# Patient Record
Sex: MALE | Race: BLACK OR AFRICAN AMERICAN | NOT HISPANIC OR LATINO
[De-identification: names, ages, dates, MRNs, and addresses within clinical notes are randomized per-mention and may not be internally consistent; named-entity substitution may affect disease eponyms.]

---

## 2018-10-03 ENCOUNTER — TRANSCRIPTION ENCOUNTER (OUTPATIENT)
Age: 33
End: 2018-10-03

## 2018-11-16 ENCOUNTER — TRANSCRIPTION ENCOUNTER (OUTPATIENT)
Age: 33
End: 2018-11-16

## 2020-04-13 ENCOUNTER — EMERGENCY (EMERGENCY)
Facility: HOSPITAL | Age: 35
LOS: 1 days | Discharge: HOME | End: 2020-04-13
Attending: EMERGENCY MEDICINE | Admitting: EMERGENCY MEDICINE
Payer: MEDICAID

## 2020-04-13 VITALS
SYSTOLIC BLOOD PRESSURE: 137 MMHG | DIASTOLIC BLOOD PRESSURE: 87 MMHG | TEMPERATURE: 100 F | RESPIRATION RATE: 20 BRPM | OXYGEN SATURATION: 100 % | HEART RATE: 102 BPM

## 2020-04-13 PROCEDURE — 93010 ELECTROCARDIOGRAM REPORT: CPT

## 2020-04-13 PROCEDURE — 99283 EMERGENCY DEPT VISIT LOW MDM: CPT | Mod: 25

## 2020-04-13 RX ORDER — FAMOTIDINE 10 MG/ML
1 INJECTION INTRAVENOUS
Qty: 30 | Refills: 0
Start: 2020-04-13 | End: 2020-05-12

## 2020-04-13 RX ORDER — ONDANSETRON 8 MG/1
1 TABLET, FILM COATED ORAL
Qty: 12 | Refills: 0
Start: 2020-04-13 | End: 2020-04-15

## 2020-04-13 NOTE — ED PROVIDER NOTE - NSFOLLOWUPINSTRUCTIONS_ED_ALL_ED_FT
You most likely have a covid-19 infection. Stay well-hydrated, and quarantine yourself for 14 days. Take tylenol and ibuprofen as needed for body aches and fevers. Take the prescribed zofran as needed for nausea/vomiting, and take the pepcid as prescribed for stomach acid. Follow-up with your doctor in 2-3 days. Return to the emergency department if you have worsening shortness of breath, cannot keep down liquids, have severe chest or abdominal pain, fevers that do not come down with ibuprofen or tylenol, or any other concerns.

## 2020-04-13 NOTE — ED PROVIDER NOTE - OBJECTIVE STATEMENT
Pt c/o 8 days of symptoms, started with nasal congestion, fevers (tmax 102 F), diarrhea, lack of appetite, fatigue. Started having some diffuse chest pain, R > L, yesterday, worse with coughing, not pleuritic, mild. Having some shortness of breath after walking a few blocks, but none at rest. Non-productive cough. Has some nausea, feels like there is increased acid in his stomach, no vomiting. His sister has tested positive for COVID-19. 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 Pt has hx HIV, on HAART, recent CD4 count 300s.

## 2020-04-13 NOTE — ED PROVIDER NOTE - PATIENT PORTAL LINK FT
You can access the FollowMyHealth Patient Portal offered by [facility] by registering at the following website: [email or web address]. By joining [facility]’s FollowMyHealth portal, you will also be able to view your health information using other applications (apps) compatible with our system.

## 2020-04-13 NOTE — ED PROVIDER NOTE - CLINICAL SUMMARY MEDICAL DECISION MAKING FREE TEXT BOX
33 yo M with hx HIV, here with URI symptoms followed by chest pain and shortness of breath. Sister with COVID-19 infection. Likely pt also has COVID, however is very well-appearing with normal VS. Ambulatory sat 97%. Less likely lobar pneumonia, ACS, PE, PTX. Will d/c home with Rx pepcid and zofran for GERD symptoms/nausea, pmd f/u, return precautions.

## 2020-04-14 ENCOUNTER — INPATIENT (INPATIENT)
Facility: HOSPITAL | Age: 35
LOS: 2 days | Discharge: HOME | DRG: 177 | End: 2020-04-17
Attending: INTERNAL MEDICINE | Admitting: INTERNAL MEDICINE
Payer: MEDICAID

## 2020-04-14 ENCOUNTER — APPOINTMENT (OUTPATIENT)
Dept: DISASTER EMERGENCY | Facility: CLINIC | Age: 35
End: 2020-04-14
Payer: MEDICAID

## 2020-04-14 ENCOUNTER — APPOINTMENT (OUTPATIENT)
Dept: DISASTER EMERGENCY | Facility: CLINIC | Age: 35
End: 2020-04-14

## 2020-04-14 VITALS
HEIGHT: 75 IN | BODY MASS INDEX: 37.3 KG/M2 | OXYGEN SATURATION: 89 % | WEIGHT: 300 LBS | TEMPERATURE: 101.8 F | RESPIRATION RATE: 14 BRPM | SYSTOLIC BLOOD PRESSURE: 121 MMHG | DIASTOLIC BLOOD PRESSURE: 71 MMHG

## 2020-04-14 VITALS
HEIGHT: 75 IN | SYSTOLIC BLOOD PRESSURE: 155 MMHG | OXYGEN SATURATION: 94 % | HEART RATE: 100 BPM | DIASTOLIC BLOOD PRESSURE: 82 MMHG | WEIGHT: 279.99 LBS | TEMPERATURE: 102 F | RESPIRATION RATE: 20 BRPM

## 2020-04-14 DIAGNOSIS — R50.9 FEVER, UNSPECIFIED: ICD-10-CM

## 2020-04-14 DIAGNOSIS — R06.02 SHORTNESS OF BREATH: ICD-10-CM

## 2020-04-14 DIAGNOSIS — J12.9 VIRAL PNEUMONIA, UNSPECIFIED: ICD-10-CM

## 2020-04-14 DIAGNOSIS — R68.89 OTHER GENERAL SYMPTOMS AND SIGNS: ICD-10-CM

## 2020-04-14 DIAGNOSIS — U07.1 COVID-19: ICD-10-CM

## 2020-04-14 DIAGNOSIS — J18.9 PNEUMONIA, UNSPECIFIED ORGANISM: ICD-10-CM

## 2020-04-14 DIAGNOSIS — Z21 ASYMPTOMATIC HUMAN IMMUNODEFICIENCY VIRUS [HIV] INFECTION STATUS: ICD-10-CM

## 2020-04-14 DIAGNOSIS — R06.2 WHEEZING: ICD-10-CM

## 2020-04-14 DIAGNOSIS — R06.00 DYSPNEA, UNSPECIFIED: ICD-10-CM

## 2020-04-14 DIAGNOSIS — R05 COUGH: ICD-10-CM

## 2020-04-14 PROBLEM — Z00.00 ENCOUNTER FOR PREVENTIVE HEALTH EXAMINATION: Status: ACTIVE | Noted: 2020-04-14

## 2020-04-14 LAB
ALBUMIN SERPL ELPH-MCNC: 3.4 G/DL — LOW (ref 3.5–5)
ALP SERPL-CCNC: 44 U/L — SIGNIFICANT CHANGE UP (ref 40–120)
ALT FLD-CCNC: 30 U/L DA — SIGNIFICANT CHANGE UP (ref 10–60)
ANION GAP SERPL CALC-SCNC: 5 MMOL/L — SIGNIFICANT CHANGE UP (ref 5–17)
AST SERPL-CCNC: 28 U/L — SIGNIFICANT CHANGE UP (ref 10–40)
BASOPHILS # BLD AUTO: 0.01 K/UL — SIGNIFICANT CHANGE UP (ref 0–0.2)
BASOPHILS NFR BLD AUTO: 0.2 % — SIGNIFICANT CHANGE UP (ref 0–2)
BILIRUB SERPL-MCNC: 0.6 MG/DL — SIGNIFICANT CHANGE UP (ref 0.2–1.2)
BUN SERPL-MCNC: 9 MG/DL — SIGNIFICANT CHANGE UP (ref 7–18)
CALCIUM SERPL-MCNC: 8.5 MG/DL — SIGNIFICANT CHANGE UP (ref 8.4–10.5)
CHLORIDE SERPL-SCNC: 97 MMOL/L — SIGNIFICANT CHANGE UP (ref 96–108)
CK SERPL-CCNC: 115 U/L — SIGNIFICANT CHANGE UP (ref 35–232)
CO2 SERPL-SCNC: 29 MMOL/L — SIGNIFICANT CHANGE UP (ref 22–31)
CREAT SERPL-MCNC: 1.16 MG/DL — SIGNIFICANT CHANGE UP (ref 0.5–1.3)
D DIMER BLD IA.RAPID-MCNC: 183 NG/ML DDU — SIGNIFICANT CHANGE UP
EOSINOPHIL # BLD AUTO: 0.17 K/UL — SIGNIFICANT CHANGE UP (ref 0–0.5)
EOSINOPHIL NFR BLD AUTO: 2.8 % — SIGNIFICANT CHANGE UP (ref 0–6)
GLUCOSE SERPL-MCNC: 84 MG/DL — SIGNIFICANT CHANGE UP (ref 70–99)
HCT VFR BLD CALC: 45.4 % — SIGNIFICANT CHANGE UP (ref 39–50)
HGB BLD-MCNC: 15.3 G/DL — SIGNIFICANT CHANGE UP (ref 13–17)
IMM GRANULOCYTES NFR BLD AUTO: 0.5 % — SIGNIFICANT CHANGE UP (ref 0–1.5)
LDH SERPL L TO P-CCNC: 278 U/L — HIGH (ref 120–225)
LYMPHOCYTES # BLD AUTO: 1.01 K/UL — SIGNIFICANT CHANGE UP (ref 1–3.3)
LYMPHOCYTES # BLD AUTO: 16.8 % — SIGNIFICANT CHANGE UP (ref 13–44)
MCHC RBC-ENTMCNC: 28.1 PG — SIGNIFICANT CHANGE UP (ref 27–34)
MCHC RBC-ENTMCNC: 33.7 GM/DL — SIGNIFICANT CHANGE UP (ref 32–36)
MCV RBC AUTO: 83.5 FL — SIGNIFICANT CHANGE UP (ref 80–100)
MONOCYTES # BLD AUTO: 0.57 K/UL — SIGNIFICANT CHANGE UP (ref 0–0.9)
MONOCYTES NFR BLD AUTO: 9.5 % — SIGNIFICANT CHANGE UP (ref 2–14)
NEUTROPHILS # BLD AUTO: 4.22 K/UL — SIGNIFICANT CHANGE UP (ref 1.8–7.4)
NEUTROPHILS NFR BLD AUTO: 70.2 % — SIGNIFICANT CHANGE UP (ref 43–77)
NRBC # BLD: 0 /100 WBCS — SIGNIFICANT CHANGE UP (ref 0–0)
PLATELET # BLD AUTO: 219 K/UL — SIGNIFICANT CHANGE UP (ref 150–400)
POTASSIUM SERPL-MCNC: 3.7 MMOL/L — SIGNIFICANT CHANGE UP (ref 3.5–5.3)
POTASSIUM SERPL-SCNC: 3.7 MMOL/L — SIGNIFICANT CHANGE UP (ref 3.5–5.3)
PROT SERPL-MCNC: 9.8 G/DL — HIGH (ref 6–8.3)
RBC # BLD: 5.44 M/UL — SIGNIFICANT CHANGE UP (ref 4.2–5.8)
RBC # FLD: 12.2 % — SIGNIFICANT CHANGE UP (ref 10.3–14.5)
SODIUM SERPL-SCNC: 131 MMOL/L — LOW (ref 135–145)
TROPONIN I SERPL-MCNC: <0.015 NG/ML — SIGNIFICANT CHANGE UP (ref 0–0.04)
WBC # BLD: 6.01 K/UL — SIGNIFICANT CHANGE UP (ref 3.8–10.5)
WBC # FLD AUTO: 6.01 K/UL — SIGNIFICANT CHANGE UP (ref 3.8–10.5)

## 2020-04-14 PROCEDURE — 99213 OFFICE O/P EST LOW 20 MIN: CPT

## 2020-04-14 PROCEDURE — 71045 X-RAY EXAM CHEST 1 VIEW: CPT | Mod: 26

## 2020-04-14 PROCEDURE — 99222 1ST HOSP IP/OBS MODERATE 55: CPT

## 2020-04-14 PROCEDURE — 99285 EMERGENCY DEPT VISIT HI MDM: CPT

## 2020-04-14 RX ORDER — ACETAMINOPHEN 500 MG
650 TABLET ORAL EVERY 6 HOURS
Refills: 0 | Status: DISCONTINUED | OUTPATIENT
Start: 2020-04-14 | End: 2020-04-17

## 2020-04-14 RX ORDER — HYDROXYCHLOROQUINE SULFATE 200 MG
TABLET ORAL
Refills: 0 | Status: DISCONTINUED | OUTPATIENT
Start: 2020-04-14 | End: 2020-04-17

## 2020-04-14 RX ORDER — HYDROXYCHLOROQUINE SULFATE 200 MG
400 TABLET ORAL EVERY 24 HOURS
Refills: 0 | Status: DISCONTINUED | OUTPATIENT
Start: 2020-04-16 | End: 2020-04-17

## 2020-04-14 RX ORDER — HYDROXYCHLOROQUINE SULFATE 200 MG
800 TABLET ORAL EVERY 24 HOURS
Refills: 0 | Status: COMPLETED | OUTPATIENT
Start: 2020-04-15 | End: 2020-04-15

## 2020-04-14 RX ORDER — ELVITEGRAVIR, COBICISTAT, EMTRICITABINE, AND TENOFOVIR ALAFENAMIDE 150; 150; 200; 10 MG/1; MG/1; MG/1; MG/1
1 TABLET ORAL DAILY
Refills: 0 | Status: DISCONTINUED | OUTPATIENT
Start: 2020-04-14 | End: 2020-04-17

## 2020-04-14 RX ORDER — CHOLECALCIFEROL (VITAMIN D3) 125 MCG
1000 CAPSULE ORAL DAILY
Refills: 0 | Status: DISCONTINUED | OUTPATIENT
Start: 2020-04-14 | End: 2020-04-17

## 2020-04-14 RX ORDER — ENOXAPARIN SODIUM 100 MG/ML
40 INJECTION SUBCUTANEOUS EVERY 12 HOURS
Refills: 0 | Status: DISCONTINUED | OUTPATIENT
Start: 2020-04-14 | End: 2020-04-17

## 2020-04-14 RX ORDER — ACETAMINOPHEN 500 MG
975 TABLET ORAL ONCE
Refills: 0 | Status: COMPLETED | OUTPATIENT
Start: 2020-04-14 | End: 2020-04-14

## 2020-04-14 RX ORDER — KETOROLAC TROMETHAMINE 30 MG/ML
15 SYRINGE (ML) INJECTION ONCE
Refills: 0 | Status: DISCONTINUED | OUTPATIENT
Start: 2020-04-14 | End: 2020-04-14

## 2020-04-14 RX ORDER — ELVITEGRAVIR, COBICISTAT, EMTRICITABINE, AND TENOFOVIR ALAFENAMIDE 150; 150; 200; 10 MG/1; MG/1; MG/1; MG/1
1 TABLET ORAL
Qty: 0 | Refills: 0 | DISCHARGE

## 2020-04-14 RX ORDER — SODIUM CHLORIDE 9 MG/ML
500 INJECTION INTRAMUSCULAR; INTRAVENOUS; SUBCUTANEOUS ONCE
Refills: 0 | Status: COMPLETED | OUTPATIENT
Start: 2020-04-14 | End: 2020-04-14

## 2020-04-14 RX ORDER — ASCORBIC ACID 60 MG
500 TABLET,CHEWABLE ORAL DAILY
Refills: 0 | Status: DISCONTINUED | OUTPATIENT
Start: 2020-04-14 | End: 2020-04-17

## 2020-04-14 RX ORDER — ERGOCALCIFEROL 1.25 MG/1
50000 CAPSULE ORAL ONCE
Refills: 0 | Status: COMPLETED | OUTPATIENT
Start: 2020-04-14 | End: 2020-04-14

## 2020-04-14 RX ADMIN — Medication 15 MILLIGRAM(S): at 18:21

## 2020-04-14 RX ADMIN — Medication 975 MILLIGRAM(S): at 21:03

## 2020-04-14 NOTE — ED PROVIDER NOTE - CLINICAL SUMMARY MEDICAL DECISION MAKING FREE TEXT BOX
34 yr old male with hx of HIV, undetectable viral load and CD4 above 300 presents to ed c/o fever, myalgia, cough, sob, fatigue, headache, nausea, diarrhea x 10 days worsening past 4 days. no smoking, no rashes, no abd pain.  compliant on hiv meds since 2008    pt with covid like sx r/o pcp vs pna in an HIV pt.  labs, cxr, toradol, re-assess

## 2020-04-14 NOTE — H&P ADULT - NSHPREVIEWOFSYSTEMS_GEN_ALL_CORE
REVIEW OF SYSTEMS:    CONSTITUTIONAL:+ Fevers and chills;  No weakness,   EYES/ENT: No visual changes;  No vertigo or throat pain   NECK: No pain or stiffness  RESPIRATORY: +cough, wheezing, hemoptysis; + shortness of breath  CARDIOVASCULAR: No chest pain or palpitations  GASTROINTESTINAL: No abdominal or epigastric pain. No nausea, vomiting, or hematemesis; No diarrhea or constipation. No melena or hematochezia.  GENITOURINARY: No dysuria, frequency or hematuria  NEUROLOGICAL: No numbness or weakness  SKIN: No itching, burning, rashes, or lesions   All other review of systems is negative unless indicated above.

## 2020-04-14 NOTE — ED PROVIDER NOTE - OBJECTIVE STATEMENT
34 yr old male with hx of HIV, undetectable viral load and CD4 above 300 presents to ed c/o fever, myalgia, cough, sob, fatigue, headache, nausea, diarrhea x 10 days worsening past 4 days. no smoking, no rashes, no abd pain.  compliant on hiv meds since 2008

## 2020-04-14 NOTE — ED ADULT NURSE NOTE - OBJECTIVE STATEMENT
33 yo male sent to ED from urgent care. Patient complaining of body aches x 10 days as well as fever, nausea, diarrhea x 4 days. Patient states he took 1000mg Tylenol at 1000 today. Tmax 101.8 at home.

## 2020-04-14 NOTE — H&P ADULT - NSHPPHYSICALEXAM_GEN_ALL_CORE
CONSTITUTIONAL: Well appearing, well nourished, awake, alert and in no apparent distress. SAO2 in RA is 96% drops to 91% with moderate exertion ( and rises back to 94%  ENMT: Airway patent, Nasal mucosa clear. Mouth with normal mucosa. Throat has no vesicles, no oropharyngeal exudates and uvula is midline.  EYES: Clear bilaterally, pupils equal, round and reactive to light. EOMI.  CARDIAC: Normal rate, regular rhythm.  Heart sounds S1, S2.  No murmurs, rubs or gallops   RESPIRATORY: Breath sounds clear. No apparent wheezes, rales or rhonchi  MUSCULOSKELETAL: Spine appears normal, range of motion is not limited, no muscle or joint tenderness  EXTREMITIES: No edema, cyanosis or deformity   NEUROLOGICAL: Alert and oriented, no focal deficits, no motor or sensory deficits.  SKIN: No rash, skin turgor

## 2020-04-14 NOTE — H&P ADULT - HISTORY OF PRESENT ILLNESS
34 year old man with PMH of HIV on ART- suboptimal compliance on Genvoya ( VL 20100/ CD 4 -314)  who presents with 10 days of fevers, cough and SOB. 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 34 year old man with PMH of HIV on ART- suboptimal compliance on Genvoya ( VL 20100/ CD 4 -314)  who presents with 10 days of fevers, cough and SOB. 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  He comes in today on account of worsening SOB  In the ED, he was said to be hypoxic with mild exertion.

## 2020-04-14 NOTE — H&P ADULT - PROBLEM SELECTOR PLAN 1
Admit to Medicine with COVID 19 isolation protocols  Stable on room air  F/up COVID 19 pcr; baseline crp /d-dimer /ferritin /ldh /procalcitonin  Serial follow up of acute phase reactants  Empiric antibiotic with Zithromax and ceftriaxone  Plaquenil regimen  Daily Lovenox 40 mg subcut  Empiric steroids  Intermittent SaO2 monitor.  Supportive care- antitussive/antipyretics.

## 2020-04-14 NOTE — ED PROVIDER NOTE - PROGRESS NOTE DETAILS
Urena: cxr mild increase perihilar marking. pt sat well while at rest but sat drops to 85% RA with minimal exertion.  admit to med for dyspnea likely covid.  pt also at the stage where symptoms can worsen

## 2020-04-14 NOTE — ED ADULT NURSE NOTE - NSIMPLEMENTINTERV_GEN_ALL_ED
Implemented All Universal Safety Interventions:  Wann to call system. Call bell, personal items and telephone within reach. Instruct patient to call for assistance. Room bathroom lighting operational. Non-slip footwear when patient is off stretcher. Physically safe environment: no spills, clutter or unnecessary equipment. Stretcher in lowest position, wheels locked, appropriate side rails in place.

## 2020-04-14 NOTE — H&P ADULT - NSHPLABSRESULTS_GEN_ALL_CORE
15.3   6.01  )-----------( 219      ( 14 Apr 2020 18:15 )             45.4     04-14    131<L>  |  97  |  9   ----------------------------<  84  3.7   |  29  |  1.16    Ca    8.5      14 Apr 2020 18:15    TPro  9.8<H>  /  Alb  3.4<L>  /  TBili  0.6  /  DBili  x   /  AST  28  /  ALT  30  /  AlkPhos  44  04-14    CARDIAC MARKERS ( 14 Apr 2020 18:15 )  <0.015 ng/mL / x     / 115 U/L / x     / x        CXR - B/L LL pneumonia    EKG - NSR with normal QTc level

## 2020-04-14 NOTE — H&P ADULT - ASSESSMENT
34 year old man with hx of HIV on ART - suboptimal control but with sufficient T cells (CD4 count) to reduce the chances /possibilities of opportunistic infections like PJP. Pt's clinical presentation and lab/imaging findings c/w COVID 19 pneumonia. There is no acute respiratory failure present on my evaluation

## 2020-04-15 LAB
CRP SERPL-MCNC: 8.45 MG/DL — HIGH (ref 0–0.4)
FERRITIN SERPL-MCNC: 503 NG/ML — HIGH (ref 30–400)
PROCALCITONIN SERPL-MCNC: 0.1 NG/ML — SIGNIFICANT CHANGE UP (ref 0.02–0.1)
SARS-COV-2 N GENE NPH QL NAA+PROBE: DETECTED

## 2020-04-15 PROCEDURE — 99232 SBSQ HOSP IP/OBS MODERATE 35: CPT

## 2020-04-15 RX ORDER — ONDANSETRON 8 MG/1
4 TABLET, FILM COATED ORAL THREE TIMES A DAY
Refills: 0 | Status: DISCONTINUED | OUTPATIENT
Start: 2020-04-15 | End: 2020-04-17

## 2020-04-15 RX ADMIN — Medication 400 MILLIGRAM(S): at 21:26

## 2020-04-15 RX ADMIN — Medication 650 MILLIGRAM(S): at 17:19

## 2020-04-15 RX ADMIN — ERGOCALCIFEROL 50000 UNIT(S): 1.25 CAPSULE ORAL at 01:11

## 2020-04-15 RX ADMIN — Medication 800 MILLIGRAM(S): at 01:11

## 2020-04-15 RX ADMIN — ENOXAPARIN SODIUM 40 MILLIGRAM(S): 100 INJECTION SUBCUTANEOUS at 17:20

## 2020-04-15 RX ADMIN — Medication 650 MILLIGRAM(S): at 23:30

## 2020-04-15 RX ADMIN — Medication 650 MILLIGRAM(S): at 01:12

## 2020-04-15 RX ADMIN — ELVITEGRAVIR, COBICISTAT, EMTRICITABINE, AND TENOFOVIR ALAFENAMIDE 1 TABLET(S): 150; 150; 200; 10 TABLET ORAL at 12:56

## 2020-04-15 RX ADMIN — ENOXAPARIN SODIUM 40 MILLIGRAM(S): 100 INJECTION SUBCUTANEOUS at 05:46

## 2020-04-15 RX ADMIN — Medication 500 MILLIGRAM(S): at 12:56

## 2020-04-15 RX ADMIN — ONDANSETRON 4 MILLIGRAM(S): 8 TABLET, FILM COATED ORAL at 22:34

## 2020-04-15 RX ADMIN — Medication 650 MILLIGRAM(S): at 06:58

## 2020-04-15 RX ADMIN — Medication 1000 UNIT(S): at 12:56

## 2020-04-15 RX ADMIN — SODIUM CHLORIDE 2000 MILLILITER(S): 9 INJECTION INTRAMUSCULAR; INTRAVENOUS; SUBCUTANEOUS at 01:11

## 2020-04-15 NOTE — PROGRESS NOTE ADULT - PROBLEM SELECTOR PLAN 1
1. Admit to Medicine with COVID 19 isolation protocols  2. Stable on room air  3. F/u COVID 19 pcr - pending   4. c/w Plaquenil regimen  5. c/w Daily Lovenox 40 mg   6. c/w Empiric steroids  7. Supportive care- antitussive/antipyretics.

## 2020-04-15 NOTE — PROGRESS NOTE ADULT - SUBJECTIVE AND OBJECTIVE BOX
NP Note discussed with  Primary Attending    Patient is a 34y old  Male who presents with a chief complaint of Fever/ cough/SOB X 10 days (14 Apr 2020 23:14)      INTERVAL HPI/OVERNIGHT EVENTS: no new complaints    MEDICATIONS  (STANDING):  ascorbic acid 500 milliGRAM(s) Oral daily  cholecalciferol 1000 Unit(s) Oral daily  enoxaparin Injectable 40 milliGRAM(s) SubCutaneous every 12 hours  hydroxychloroquine   Oral   tenofovir alafenamide 10 mG/[id number] mG/cobicistat 150 mG/emtricitabine 200 mG (GENVOYA) 1 Tablet(s) Oral daily    MEDICATIONS  (PRN):  acetaminophen   Tablet .. 650 milliGRAM(s) Oral every 6 hours PRN Temp greater or equal to 38C (100.4F)      __________________________________________________  REVIEW OF SYSTEMS:    CONSTITUTIONAL: No fever,   EYES: no acute visual disturbances  NECK: No pain or stiffness  RESPIRATORY: No cough; No shortness of breath  CARDIOVASCULAR: No chest pain, no palpitations  GASTROINTESTINAL: No pain. No nausea or vomiting; No diarrhea   NEUROLOGICAL: No headache or numbness, no tremors  MUSCULOSKELETAL: No joint pain, no muscle pain  GENITOURINARY: no dysuria, no frequency, no hesitancy  PSYCHIATRY: no depression , no anxiety  ALL OTHER  ROS negative        Vital Signs Last 24 Hrs  T(C): 37.3 (15 Apr 2020 08:00), Max: 39 (14 Apr 2020 17:32)  T(F): 99.2 (15 Apr 2020 08:00), Max: 102.2 (14 Apr 2020 17:32)  HR: 92 (15 Apr 2020 08:00) (89 - 100)  BP: 118/76 (15 Apr 2020 08:00) (118/76 - 162/94)  BP(mean): --  RR: 18 (15 Apr 2020 08:00) (17 - 20)  SpO2: 95% (15 Apr 2020 08:00) (94% - 97%)    ________________________________________________  PHYSICAL EXAM:  GENERAL: NAD  HEENT: Normocephalic;  conjunctivae and sclerae clear; moist mucous membranes;   NECK : supple  CHEST/LUNG: Clear to auscultation bilaterally with good air entry   HEART: S1 S2  regular; no murmurs, gallops or rubs  ABDOMEN: Soft, Nontender, Nondistended; Bowel sounds present  EXTREMITIES: no cyanosis; no edema; no calf tenderness  SKIN: warm and dry; no rash  NERVOUS SYSTEM:  Awake and alert; Oriented  to place, person and time ; no new deficits    _________________________________________________  LABS:                        15.3   6.01  )-----------( 219      ( 14 Apr 2020 18:15 )             45.4     04-14    131<L>  |  97  |  9   ----------------------------<  84  3.7   |  29  |  1.16    Ca    8.5      14 Apr 2020 18:15    TPro  9.8<H>  /  Alb  3.4<L>  /  TBili  0.6  /  DBili  x   /  AST  28  /  ALT  30  /  AlkPhos  44  04-14        CAPILLARY BLOOD GLUCOSE

## 2020-04-15 NOTE — ED ADULT NURSE REASSESSMENT NOTE - NS ED NURSE REASSESS COMMENT FT1
Ambulated pt in the halls with O2 monitoring. Pt desat from 97% to 92% on RA. Pt denies any symptoms of fatigue while ambulating this time. Continue to monitor.
Spoke to supervisor Stacey regarding patients COVID results. She approved that patient may go up to up .
Pt given Tylenol for fever. Pt educated on using Peak Flow meter (360-Best effort) and using Incentive Spirometer.

## 2020-04-16 ENCOUNTER — TRANSCRIPTION ENCOUNTER (OUTPATIENT)
Age: 35
End: 2020-04-16

## 2020-04-16 DIAGNOSIS — Z02.9 ENCOUNTER FOR ADMINISTRATIVE EXAMINATIONS, UNSPECIFIED: ICD-10-CM

## 2020-04-16 DIAGNOSIS — Z29.9 ENCOUNTER FOR PROPHYLACTIC MEASURES, UNSPECIFIED: ICD-10-CM

## 2020-04-16 DIAGNOSIS — U07.1 COVID-19: ICD-10-CM

## 2020-04-16 PROCEDURE — 99232 SBSQ HOSP IP/OBS MODERATE 35: CPT

## 2020-04-16 RX ORDER — ASCORBIC ACID 60 MG
1 TABLET,CHEWABLE ORAL
Qty: 14 | Refills: 0
Start: 2020-04-16 | End: 2020-04-29

## 2020-04-16 RX ORDER — CHOLECALCIFEROL (VITAMIN D3) 125 MCG
1000 CAPSULE ORAL
Qty: 30 | Refills: 0
Start: 2020-04-16 | End: 2020-05-15

## 2020-04-16 RX ORDER — ACETAMINOPHEN 500 MG
2 TABLET ORAL
Qty: 40 | Refills: 0
Start: 2020-04-16 | End: 2020-04-20

## 2020-04-16 RX ADMIN — ENOXAPARIN SODIUM 40 MILLIGRAM(S): 100 INJECTION SUBCUTANEOUS at 17:21

## 2020-04-16 RX ADMIN — Medication 1000 UNIT(S): at 12:51

## 2020-04-16 RX ADMIN — ELVITEGRAVIR, COBICISTAT, EMTRICITABINE, AND TENOFOVIR ALAFENAMIDE 1 TABLET(S): 150; 150; 200; 10 TABLET ORAL at 12:51

## 2020-04-16 RX ADMIN — Medication 500 MILLIGRAM(S): at 12:50

## 2020-04-16 RX ADMIN — ENOXAPARIN SODIUM 40 MILLIGRAM(S): 100 INJECTION SUBCUTANEOUS at 06:07

## 2020-04-16 RX ADMIN — Medication 400 MILLIGRAM(S): at 22:10

## 2020-04-16 NOTE — DISCHARGE NOTE PROVIDER - HOSPITAL COURSE
A 34 year old man with PMH of HIV on ART- suboptimal compliance on Genvoya ( VL 20100/ CD 4 -896)  who presents with 10 days of fevers, cough and SOB. 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    In the ED, he was said to be hypoxic with mild exertion.    Pt is admitted for COVID 19 pneumonia. Pt pO2 sat 93-94 % on room air with ambulating. Pt had fever overnight likely due to viral infection. Remains afebrile since.             incomplete- Plan to monitor for fever next 24 hrs. then discharge home.                 CORONAVIRUS INSTRUCTIONS: Based on your current clinical status and stability, it has been determined that you no longer need hospitalization and can recover while remaining in self-quarantine at home. You should follow the prevention steps below until a healthcare provider or local or state health department says you can return to your normal activities. 1. You should restrict activities outside your home, except for getting medical care. 2. Do not go to work, school, or public areas. 3. Avoid using public transportation, ride-sharing, or taxis. 4. Separate yourself from other people and animals in your home as much as possible.  When you are around other people (e.g., sharing a room or vehicle) you should wear a facemask.    5. Wash your hands often with soap and water for at least 20 seconds, especially after blowing your nose, coughing, or sneezing; going to the bathroom; and before eating or preparing food.6. Cover your mouth and nose with a tissue when you cough or sneeze. Throw used tissues in a lined trash can. Immediately wash your hands with soap and water for at least 20 seconds7. High touch surfaces include counters, tabletops, doorknobs, bathroom fixtures, toilets, phones, keyboards, tablets, and bedside tables.8. Avoid sharing dishes, drinking glasses, cups, eating utensils, towels, or bedding with other people or pets in your home. After using these items, they should be washed thoroughly with soap and water.You are strongly advised to seek prompt medical attention if your illness worsens or you develop new symptoms like fever or difficulty breathing. A 34 year old man with PMH of HIV on ART- suboptimal compliance on Genvoya ( VL 20100/ CD 4 -393)  who presents with 10 days of fevers, cough and SOB. 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    In the ED, he was said to be hypoxic with mild exertion.    Pt is admitted for COVID 19 pneumonia. Pt pO2 sat 96 % on room air with ambulating. Pt had fever overnight likely due to viral infection. Remains afebrile over 24hrs.     Repeated COVID PCR due to no hospital records. Last BMP shows Na 131. replaced. repeated Na----    Patient is medically optimized for discharge home.         CORONAVIRUS INSTRUCTIONS: Based on your current clinical status and stability, it has been determined that you no longer need hospitalization and can recover while remaining in self-quarantine at home. You should follow the prevention steps below until a healthcare provider or local or state health department says you can return to your normal activities. 1. You should restrict activities outside your home, except for getting medical care. 2. Do not go to work, school, or public areas. 3. Avoid using public transportation, ride-sharing, or taxis. 4. Separate yourself from other people and animals in your home as much as possible.  When you are around other people (e.g., sharing a room or vehicle) you should wear a facemask.    5. Wash your hands often with soap and water for at least 20 seconds, especially after blowing your nose, coughing, or sneezing; going to the bathroom; and before eating or preparing food.6. Cover your mouth and nose with a tissue when you cough or sneeze. Throw used tissues in a lined trash can. Immediately wash your hands with soap and water for at least 20 seconds7. High touch surfaces include counters, tabletops, doorknobs, bathroom fixtures, toilets, phones, keyboards, tablets, and bedside tables.8. Avoid sharing dishes, drinking glasses, cups, eating utensils, towels, or bedding with other people or pets in your home. After using these items, they should be washed thoroughly with soap and water.You are strongly advised to seek prompt medical attention if your illness worsens or you develop new symptoms like fever or difficulty breathing. A 34 year old man with PMH of HIV on ART- suboptimal compliance on Genvoya ( VL 20100/ CD 4 -482)  who presents with 10 days of fevers, cough and SOB. 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    In the ED, he was said to be hypoxic with mild exertion.    Pt is admitted for COVID 19 pneumonia. Pt pO2 sat 96 % on room air with ambulating. Pt had fever overnight likely due to viral infection. Remains afebrile over 24hrs.     Repeated COVID PCR due to no hospital records. Pt was replaced for hyponatremia with IVF. repeated Na improved. Patient was advised to follow up with primary MD.     Patient is medically optimized for discharge home.         CORONAVIRUS INSTRUCTIONS: Based on your current clinical status and stability, it has been determined that you no longer need hospitalization and can recover while remaining in self-quarantine at home. You should follow the prevention steps below until a healthcare provider or local or state health department says you can return to your normal activities. 1. You should restrict activities outside your home, except for getting medical care. 2. Do not go to work, school, or public areas. 3. Avoid using public transportation, ride-sharing, or taxis. 4. Separate yourself from other people and animals in your home as much as possible.  When you are around other people (e.g., sharing a room or vehicle) you should wear a facemask.    5. Wash your hands often with soap and water for at least 20 seconds, especially after blowing your nose, coughing, or sneezing; going to the bathroom; and before eating or preparing food.6. Cover your mouth and nose with a tissue when you cough or sneeze. Throw used tissues in a lined trash can. Immediately wash your hands with soap and water for at least 20 seconds7. High touch surfaces include counters, tabletops, doorknobs, bathroom fixtures, toilets, phones, keyboards, tablets, and bedside tables.8. Avoid sharing dishes, drinking glasses, cups, eating utensils, towels, or bedding with other people or pets in your home. After using these items, they should be washed thoroughly with soap and water.You are strongly advised to seek prompt medical attention if your illness worsens or you develop new symptoms like fever or difficulty breathing.

## 2020-04-16 NOTE — PROGRESS NOTE ADULT - SUBJECTIVE AND OBJECTIVE BOX
HPI- A 34 year old man with PMH of HIV on ART- suboptimal compliance on Genvoya ( VL 20100/ CD 4 -314)  who presents with 10 days of fevers, cough and SOB. 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  In the ED, he was said to be hypoxic with mild exertion.  Pt is admitted for COVID 19 pneumonia. Pt pO2 sat 93-94 % on room air with ambulating.       INTERVAL HPI/OVERNIGHT EVENTS: fever overnight 102. 4F down to 99F this AM.  Asymptomatic of sob, fever or cough.       MEDICATIONS  (STANDING):  ascorbic acid 500 milliGRAM(s) Oral daily  cholecalciferol 1000 Unit(s) Oral daily  enoxaparin Injectable 40 milliGRAM(s) SubCutaneous every 12 hours  hydroxychloroquine   Oral   hydroxychloroquine 400 milliGRAM(s) Oral every 24 hours  tenofovir alafenamide 10 mG/jdcliyphlcft447 mG/cobicistat 150 mG/emtricitabine 200 mG (GENVOYA) 1 Tablet(s) Oral daily    MEDICATIONS  (PRN):  acetaminophen   Tablet .. 650 milliGRAM(s) Oral every 6 hours PRN Temp greater or equal to 38C (100.4F)  ondansetron    Tablet 4 milliGRAM(s) Oral three times a day PRN [place]      __________________________________________________  REVIEW OF SYSTEMS:    CONSTITUTIONAL: No fever,   EYES: no acute visual disturbances  NECK: No pain or stiffness  RESPIRATORY: No cough; No shortness of breath  CARDIOVASCULAR: No chest pain, no palpitations  GASTROINTESTINAL: No pain. No nausea or vomiting; No diarrhea   NEUROLOGICAL: No headache or numbness, no tremors  MUSCULOSKELETAL: No joint pain, no muscle pain  GENITOURINARY: no dysuria, no frequency, no hesitancy  PSYCHIATRY: no depression , no anxiety  ALL OTHER  ROS negative        Vital Signs Last 24 Hrs  T(C): 37.2 (16 Apr 2020 09:51), Max: 39.1 (16 Apr 2020 00:05)  T(F): 99 (16 Apr 2020 09:51), Max: 102.4 (16 Apr 2020 00:05)  HR: 88 (16 Apr 2020 09:51) (87 - 104)  BP: 125/84 (16 Apr 2020 09:51) (113/71 - 145/72)  BP(mean): --  RR: 17 (16 Apr 2020 09:51) (16 - 20)  SpO2: 94% (16 Apr 2020 09:51) (94% - 96%)    ________________________________________________  PHYSICAL EXAM:  GENERAL: NAD. well developed. conversant  HEENT: Normocephalic;  conjunctivae and sclerae clear; moist mucous membranes;   NECK : supple  CHEST/LUNG: Clear to auscultation bilaterally with good air entry   HEART: S1 S2  regular; no murmurs, gallops or rubs  ABDOMEN: Soft, Nontender, Nondistended; Bowel sounds present  EXTREMITIES: no cyanosis; no edema; no calf tenderness  SKIN: warm and dry; no rash  NERVOUS SYSTEM:  Awake and alert; Oriented  to place, person and time ; no new deficits    _________________________________________________  LABS:                        15.3   6.01  )-----------( 219      ( 14 Apr 2020 18:15 )             45.4     04-14    131<L>  |  97  |  9   ----------------------------<  84  3.7   |  29  |  1.16    Ca    8.5      14 Apr 2020 18:15    TPro  9.8<H>  /  Alb  3.4<L>  /  TBili  0.6  /  DBili  x   /  AST  28  /  ALT  30  /  AlkPhos  44  04-14        CAPILLARY BLOOD GLUCOSE            RADIOLOGY & ADDITIONAL TESTS:    Imaging  Reviewed:  YES    < from: Xray Chest 1 View-PORTABLE IMMEDIATE (04.14.20 @ 18:57) >    EXAM:  XR CHEST PORTABLE IMMED 1V                            PROCEDURE DATE:  04/14/2020          INTERPRETATION:  XR CHEST IMMEDIATE    Single AP view    HISTORY:  Shortness of Breath, Cough,  Fever    Comparison: none.      The cardiac silhouetteis within normal limits. Patchy bilateral airspace disease. No pleural abnormality.    IMPRESSION: Bilateral airspace disease compatible with atypical/viral pneumonia.                SHAHID PEOPLES M.D., ATTENDING RADIOLOGIST  This document has been electronically signed. Apr 14 2020  7:31PM                < end of copied text >    Consultant(s) Notes Reviewed:   YES      Plan of care was discussed with patient and all questions and concerns were addressed

## 2020-04-16 NOTE — DISCHARGE NOTE PROVIDER - NSDCCPCAREPLAN_GEN_ALL_CORE_FT
PRINCIPAL DISCHARGE DIAGNOSIS  Diagnosis: Pneumonia due to COVID-19 virus  Assessment and Plan of Treatment: You were diagnosed with viral pneumonia due to corona virus infection.   Continue medications as prescribed.   Take tylenol for fever as instructed.   Maintain self quaranteened for 2 weeks at home.   Report your PCP for shortness of breath or unable to hydrate yourself orally.   CORONAVIRUS INSTRUCTIONS: Based on your current clinical status and stability, it has been determined that you no longer need hospitalization and can recover while remaining in self-quarantine at home. You should follow the prevention steps below until a healthcare provider or local or state health department says you can return to your normal activities. 1. You should restrict activities outside your home, except for getting medical care. 2. Do not go to work, school, or public areas. 3. Avoid using public transportation, ride-sharing, or taxis. 4. Separate yourself from other people and animals in your home as much as possible.  When you are around other people (e.g., sharing a room or vehicle) you should wear a facemask.  5. Wash your hands often with soap and water for at least 20 seconds, especially after blowing your nose, coughing, or sneezing; going to the bathroom; and before eating or preparing food.6. Cover your mouth and nose with a tissue when you cough or sneeze. Throw used tissues in a lined trash can. Immediately wash your hands with soap and water for at least 20 seconds7. High touch surfaces include counters, tabletops, doorknobs, bathroom fixtures, toilets, phones, keyboards, tablets, and bedside tables.8. Avoid sharing dishes, drinking glasses, cups, eating utensils, towels, or bedding with other people or pets in your home. After using these items, they should be washed thoroughly with soap and water.You are strongly advised to seek prompt medical attention if your illness worsens or you develop new symptoms like fever or difficulty breathing.        SECONDARY DISCHARGE DIAGNOSES  Diagnosis: Asymptomatic HIV infection  Assessment and Plan of Treatment: Continue taking medication as prescribed.   Follow up with primary MD after discharge. PRINCIPAL DISCHARGE DIAGNOSIS  Diagnosis: Pneumonia due to COVID-19 virus  Assessment and Plan of Treatment: You were diagnosed with viral pneumonia due to corona virus infection.   Continue medications as prescribed.   Take tylenol for fever as instructed.   Maintain self quaranteened for 2 weeks at home.   Report your PCP for shortness of breath or unable to hydrate yourself orally.   CORONAVIRUS INSTRUCTIONS: Based on your current clinical status and stability, it has been determined that you no longer need hospitalization and can recover while remaining in self-quarantine at home. You should follow the prevention steps below until a healthcare provider or local or state health department says you can return to your normal activities. 1. You should restrict activities outside your home, except for getting medical care. 2. Do not go to work, school, or public areas. 3. Avoid using public transportation, ride-sharing, or taxis. 4. Separate yourself from other people and animals in your home as much as possible.  When you are around other people (e.g., sharing a room or vehicle) you should wear a facemask.  5. Wash your hands often with soap and water for at least 20 seconds, especially after blowing your nose, coughing, or sneezing; going to the bathroom; and before eating or preparing food.6. Cover your mouth and nose with a tissue when you cough or sneeze. Throw used tissues in a lined trash can. Immediately wash your hands with soap and water for at least 20 seconds7. High touch surfaces include counters, tabletops, doorknobs, bathroom fixtures, toilets, phones, keyboards, tablets, and bedside tables.8. Avoid sharing dishes, drinking glasses, cups, eating utensils, towels, or bedding with other people or pets in your home. After using these items, they should be washed thoroughly with soap and water.You are strongly advised to seek prompt medical attention if your illness worsens or you develop new symptoms like fever or difficulty breathing.        SECONDARY DISCHARGE DIAGNOSES  Diagnosis: Hyponatremia  Assessment and Plan of Treatment: You were treated for low sodium level in your blood.   Repeated blood test result in pending.   We will call you for abnormal result and for follow up with PMD    Diagnosis: Asymptomatic HIV infection  Assessment and Plan of Treatment: Continue taking medication as prescribed.   Follow up with primary MD after discharge. PRINCIPAL DISCHARGE DIAGNOSIS  Diagnosis: Pneumonia due to COVID-19 virus  Assessment and Plan of Treatment: You were diagnosed with viral pneumonia due to corona virus infection.   Continue medications as prescribed.   Take tylenol for fever as instructed.   Maintain self quaranteened for 2 weeks at home.   Report your PCP for shortness of breath or unable to hydrate yourself orally.   CORONAVIRUS INSTRUCTIONS: Based on your current clinical status and stability, it has been determined that you no longer need hospitalization and can recover while remaining in self-quarantine at home. You should follow the prevention steps below until a healthcare provider or local or [facility] health department says you can return to your normal activities. 1. You should restrict activities outside your home, except for getting medical care. 2. Do not go to work, school, or public areas. 3. Avoid using public transportation, ride-sharing, or taxis. 4. Separate yourself from other people and animals in your home as much as possible.  When you are around other people (e.g., sharing a room or vehicle) you should wear a facemask.  5. Wash your hands often with soap and water for at least 20 seconds, especially after blowing your nose, coughing, or sneezing; going to the bathroom; and before eating or preparing food.6. Cover your mouth and nose with a tissue when you cough or sneeze. Throw used tissues in a lined trash can. Immediately wash your hands with soap and water for at least 20 seconds7. High touch surfaces include counters, tabletops, doorknobs, bathroom fixtures, toilets, phones, keyboards, tablets, and bedside tables.8. Avoid sharing dishes, drinking glasses, cups, eating utensils, towels, or bedding with other people or pets in your home. After using these items, they should be washed thoroughly with soap and water.You are strongly advised to seek prompt medical attention if your illness worsens or you develop new symptoms like fever or difficulty breathing.        SECONDARY DISCHARGE DIAGNOSES  Diagnosis: Hypermagnesemia  Assessment and Plan of Treatment: Your magnesium level in your blood slightly elevated above normal range.   Avoid drugs containing magnesium, such as some laxatives and antiacids.   Follow up with primary MD for check up. call first for appointment    Diagnosis: Hyponatremia  Assessment and Plan of Treatment: You were treated for low sodium level in your blood.   Repeated blood test result is improved.   follow up with PMD for regular check up.    Diagnosis: Asymptomatic HIV infection  Assessment and Plan of Treatment: Continue taking medication as prescribed.   Follow up with primary MD after discharge.

## 2020-04-16 NOTE — PROGRESS NOTE ADULT - PROBLEM SELECTOR PLAN 2
c/w Genvoya PO daily
1. Likely viral  2.COVID 19 pcr - pending   3. c/w Plaquenil regimen  4. c/w Empiric steroids  5. Supportive care- antitussive/antipyretics.

## 2020-04-16 NOTE — DISCHARGE NOTE PROVIDER - PROVIDER TOKENS
FREE:[LAST:[TIANNA],FIRST:[KLEIN],PHONE:[phone]],FAX:[(   )    -],ADDRESS:[PCP  Call first for appointment],FOLLOWUP:[1 week]]

## 2020-04-16 NOTE — PROGRESS NOTE ADULT - PROBLEM SELECTOR PLAN 4
came from home  unable to isolate at home. pt may need intra- facility transfer. CM is aware. came from home  monitor Tmax for next 24 hrs and d/c home tomorrow.   DC planning discussed with ICT team and PMD.

## 2020-04-16 NOTE — DISCHARGE NOTE PROVIDER - NSDCFUADDAPPT_GEN_ALL_CORE_FT
Need to monitor CMP. Magnesium level after discharge. Follow up with your PMD. call first for an appointment.

## 2020-04-16 NOTE — DISCHARGE NOTE PROVIDER - NSDCFUADDINST_GEN_ALL_CORE_FT
CORONAVIRUS INSTRUCTIONS: Based on your current clinical status and stability, it has been determined that you no longer need hospitalization and can recover while remaining in self-quarantine at home. You should follow the prevention steps below until a healthcare provider or local or state health department says you can return to your normal activities. 1. You should restrict activities outside your home, except for getting medical care. 2. Do not go to work, school, or public areas. 3. Avoid using public transportation, ride-sharing, or taxis. 4. Separate yourself from other people and animals in your home as much as possible.  When you are around other people (e.g., sharing a room or vehicle) you should wear a facemask.  5. Wash your hands often with soap and water for at least 20 seconds, especially after blowing your nose, coughing, or sneezing; going to the bathroom; and before eating or preparing food.6. Cover your mouth and nose with a tissue when you cough or sneeze. Throw used tissues in a lined trash can. Immediately wash your hands with soap and water for at least 20 seconds7. High touch surfaces include counters, tabletops, doorknobs, bathroom fixtures, toilets, phones, keyboards, tablets, and bedside tables.8. Avoid sharing dishes, drinking glasses, cups, eating utensils, towels, or bedding with other people or pets in your home. After using these items, they should be washed thoroughly with soap and water.You are strongly advised to seek prompt medical attention if your illness worsens or you develop new symptoms like fever or difficulty breathing.

## 2020-04-16 NOTE — PROGRESS NOTE ADULT - PROBLEM SELECTOR PLAN 1
viral PNA due to + COVID 19  CXR as above  pO2 sat 93-94% on room air with ambulating  c/w Plaquenil # 3  c/w Daily lovenox 40mg. Steroid  supportive care- antitussive/antipyretics. Vitamins viral PNA due to + COVID 19  CXR as above  pO2 sat 93-94% on room air with ambulating  Tmax 102.4F---> 99F today  c/w Plaquenil # 3  c/w Daily lovenox 40mg. Steroid  supportive care- antitussive/antipyretics. Vitamins

## 2020-04-16 NOTE — DISCHARGE NOTE PROVIDER - NSDCMRMEDTOKEN_GEN_ALL_CORE_FT
Genvoya oral tablet: 1 tab(s) orally once a day acetaminophen 325 mg oral tablet: 2 tab(s) orally every 6 hours, As needed, Temp greater or equal to 38C (100.4F)  ascorbic acid 500 mg oral tablet: 1 tab(s) orally once a day  cholecalciferol oral tablet: 1000 unit(s) orally once a day  Genvoya oral tablet: 1 tab(s) orally once a day  hydroxychloroquine 200 mg oral tablet: 200 milligram(s) orally once a day

## 2020-04-16 NOTE — PROGRESS NOTE ADULT - ATTENDING COMMENTS
34 year old man with hx of HIV on HAART. Pt's clinical presentation and lab/imaging findings c/w COVID 19 pneumonia, stable on RA, still with fevers,but likely related to underlying virus. One time fever of T 102.4 overnight.     Plan:  -monitor fever curve for 1 more day. Can be d/c tomorrow if no high fevers over 24 hour period.  -case management  consulted - can go home from isolation standpoint.  -complete HCQ started 4/14-    Dr. Mariah Josue  [phone]  [email or web address]
34 year old man with hx of HIV on HAART. Pt's clinical presentation and lab/imaging findings c/w COVID 19 pneumonia, stable on RA, still with fevers,but likely related to underlying virus.    Plan:  -patient does not require acute hospital stay  -case management should be consulted since patient is unable to isolate  - may be transfer to [place] if needed.  -complete HCQ started 4/14-    Dr. Mariah Josue  [phone]  [email or web address]

## 2020-04-16 NOTE — DISCHARGE NOTE PROVIDER - CARE PROVIDER_API CALL
SHALINI WYNN  PCP  Call first for appointment  Phone: [phone]  Fax: (   )    -  Follow Up Time: 1 week

## 2020-04-17 ENCOUNTER — TRANSCRIPTION ENCOUNTER (OUTPATIENT)
Age: 35
End: 2020-04-17

## 2020-04-17 VITALS
OXYGEN SATURATION: 98 % | RESPIRATION RATE: 18 BRPM | SYSTOLIC BLOOD PRESSURE: 127 MMHG | TEMPERATURE: 99 F | DIASTOLIC BLOOD PRESSURE: 81 MMHG | HEART RATE: 98 BPM

## 2020-04-17 LAB
ALBUMIN SERPL ELPH-MCNC: 3.1 G/DL — LOW (ref 3.5–5)
ALP SERPL-CCNC: 42 U/L — SIGNIFICANT CHANGE UP (ref 40–120)
ALT FLD-CCNC: 30 U/L DA — SIGNIFICANT CHANGE UP (ref 10–60)
ANION GAP SERPL CALC-SCNC: 9 MMOL/L — SIGNIFICANT CHANGE UP (ref 5–17)
ANISOCYTOSIS BLD QL: SLIGHT — SIGNIFICANT CHANGE UP
AST SERPL-CCNC: 25 U/L — SIGNIFICANT CHANGE UP (ref 10–40)
BASOPHILS # BLD AUTO: 0.01 K/UL — SIGNIFICANT CHANGE UP (ref 0–0.2)
BASOPHILS NFR BLD AUTO: 0.4 % — SIGNIFICANT CHANGE UP (ref 0–2)
BILIRUB SERPL-MCNC: 0.4 MG/DL — SIGNIFICANT CHANGE UP (ref 0.2–1.2)
BUN SERPL-MCNC: 7 MG/DL — SIGNIFICANT CHANGE UP (ref 7–18)
CALCIUM SERPL-MCNC: 8.9 MG/DL — SIGNIFICANT CHANGE UP (ref 8.4–10.5)
CHLORIDE SERPL-SCNC: 99 MMOL/L — SIGNIFICANT CHANGE UP (ref 96–108)
CK SERPL-CCNC: 100 U/L — SIGNIFICANT CHANGE UP (ref 35–232)
CO2 SERPL-SCNC: 26 MMOL/L — SIGNIFICANT CHANGE UP (ref 22–31)
CREAT SERPL-MCNC: 0.97 MG/DL — SIGNIFICANT CHANGE UP (ref 0.5–1.3)
CRP SERPL-MCNC: 6.68 MG/DL — HIGH (ref 0–0.4)
EOSINOPHIL # BLD AUTO: 0.01 K/UL — SIGNIFICANT CHANGE UP (ref 0–0.5)
EOSINOPHIL NFR BLD AUTO: 0.4 % — SIGNIFICANT CHANGE UP (ref 0–6)
FIBRINOGEN PPP-MCNC: 934 MG/DL — HIGH (ref 350–510)
GLUCOSE SERPL-MCNC: 112 MG/DL — HIGH (ref 70–99)
HCT VFR BLD CALC: 45.6 % — SIGNIFICANT CHANGE UP (ref 39–50)
HGB BLD-MCNC: 15.4 G/DL — SIGNIFICANT CHANGE UP (ref 13–17)
HYPOCHROMIA BLD QL: SLIGHT — SIGNIFICANT CHANGE UP
IMM GRANULOCYTES NFR BLD AUTO: 0.7 % — SIGNIFICANT CHANGE UP (ref 0–1.5)
LYMPHOCYTES # BLD AUTO: 0.97 K/UL — LOW (ref 1–3.3)
LYMPHOCYTES # BLD AUTO: 34.5 % — SIGNIFICANT CHANGE UP (ref 13–44)
MAGNESIUM SERPL-MCNC: 2.9 MG/DL — HIGH (ref 1.6–2.6)
MANUAL SMEAR VERIFICATION: SIGNIFICANT CHANGE UP
MCHC RBC-ENTMCNC: 28.1 PG — SIGNIFICANT CHANGE UP (ref 27–34)
MCHC RBC-ENTMCNC: 33.8 GM/DL — SIGNIFICANT CHANGE UP (ref 32–36)
MCV RBC AUTO: 83.1 FL — SIGNIFICANT CHANGE UP (ref 80–100)
MONOCYTES # BLD AUTO: 0.43 K/UL — SIGNIFICANT CHANGE UP (ref 0–0.9)
MONOCYTES NFR BLD AUTO: 15.3 % — HIGH (ref 2–14)
NEUTROPHILS # BLD AUTO: 1.37 K/UL — LOW (ref 1.8–7.4)
NEUTROPHILS NFR BLD AUTO: 48.7 % — SIGNIFICANT CHANGE UP (ref 43–77)
NRBC # BLD: 0 /100 WBCS — SIGNIFICANT CHANGE UP (ref 0–0)
NT-PROBNP SERPL-SCNC: 7 PG/ML — SIGNIFICANT CHANGE UP (ref 0–125)
PHOSPHATE SERPL-MCNC: 3.2 MG/DL — SIGNIFICANT CHANGE UP (ref 2.5–4.5)
PLAT MORPH BLD: NORMAL — SIGNIFICANT CHANGE UP
PLATELET # BLD AUTO: 309 K/UL — SIGNIFICANT CHANGE UP (ref 150–400)
PLATELET COUNT - ESTIMATE: NORMAL — SIGNIFICANT CHANGE UP
POIKILOCYTOSIS BLD QL AUTO: SLIGHT — SIGNIFICANT CHANGE UP
POLYCHROMASIA BLD QL SMEAR: SLIGHT — SIGNIFICANT CHANGE UP
POTASSIUM SERPL-MCNC: 3.6 MMOL/L — SIGNIFICANT CHANGE UP (ref 3.5–5.3)
POTASSIUM SERPL-SCNC: 3.6 MMOL/L — SIGNIFICANT CHANGE UP (ref 3.5–5.3)
PROT SERPL-MCNC: 10 G/DL — HIGH (ref 6–8.3)
RBC # BLD: 5.49 M/UL — SIGNIFICANT CHANGE UP (ref 4.2–5.8)
RBC # FLD: 11.9 % — SIGNIFICANT CHANGE UP (ref 10.3–14.5)
RBC BLD AUTO: ABNORMAL
SODIUM SERPL-SCNC: 134 MMOL/L — LOW (ref 135–145)
TROPONIN I SERPL-MCNC: <0.015 NG/ML — SIGNIFICANT CHANGE UP (ref 0–0.04)
WBC # BLD: 2.81 K/UL — LOW (ref 3.8–10.5)
WBC # FLD AUTO: 2.81 K/UL — LOW (ref 3.8–10.5)

## 2020-04-17 PROCEDURE — 86140 C-REACTIVE PROTEIN: CPT

## 2020-04-17 PROCEDURE — 84145 PROCALCITONIN (PCT): CPT

## 2020-04-17 PROCEDURE — 36415 COLL VENOUS BLD VENIPUNCTURE: CPT

## 2020-04-17 PROCEDURE — 85027 COMPLETE CBC AUTOMATED: CPT

## 2020-04-17 PROCEDURE — 84484 ASSAY OF TROPONIN QUANT: CPT

## 2020-04-17 PROCEDURE — 82728 ASSAY OF FERRITIN: CPT

## 2020-04-17 PROCEDURE — 84100 ASSAY OF PHOSPHORUS: CPT

## 2020-04-17 PROCEDURE — 85384 FIBRINOGEN ACTIVITY: CPT

## 2020-04-17 PROCEDURE — 82550 ASSAY OF CK (CPK): CPT

## 2020-04-17 PROCEDURE — 71045 X-RAY EXAM CHEST 1 VIEW: CPT

## 2020-04-17 PROCEDURE — 93005 ELECTROCARDIOGRAM TRACING: CPT

## 2020-04-17 PROCEDURE — 96374 THER/PROPH/DIAG INJ IV PUSH: CPT

## 2020-04-17 PROCEDURE — 99238 HOSP IP/OBS DSCHRG MGMT 30/<: CPT

## 2020-04-17 PROCEDURE — 85379 FIBRIN DEGRADATION QUANT: CPT

## 2020-04-17 PROCEDURE — 83735 ASSAY OF MAGNESIUM: CPT

## 2020-04-17 PROCEDURE — 83880 ASSAY OF NATRIURETIC PEPTIDE: CPT

## 2020-04-17 PROCEDURE — 80053 COMPREHEN METABOLIC PANEL: CPT

## 2020-04-17 PROCEDURE — 99285 EMERGENCY DEPT VISIT HI MDM: CPT

## 2020-04-17 PROCEDURE — 83615 LACTATE (LD) (LDH) ENZYME: CPT

## 2020-04-17 RX ADMIN — Medication 500 MILLIGRAM(S): at 10:26

## 2020-04-17 RX ADMIN — Medication 1000 UNIT(S): at 10:23

## 2020-04-17 RX ADMIN — ENOXAPARIN SODIUM 40 MILLIGRAM(S): 100 INJECTION SUBCUTANEOUS at 05:59

## 2020-04-17 RX ADMIN — ELVITEGRAVIR, COBICISTAT, EMTRICITABINE, AND TENOFOVIR ALAFENAMIDE 1 TABLET(S): 150; 150; 200; 10 TABLET ORAL at 10:22

## 2020-04-17 NOTE — DISCHARGE NOTE NURSING/CASE MANAGEMENT/SOCIAL WORK - PATIENT PORTAL LINK FT
You can access the FollowMyHealth Patient Portal offered by [facility] by registering at the following website: [email or web address]. By joining [employer]’s FollowMyHealth portal, you will also be able to view your health information using other applications (apps) compatible with our system.

## 2020-04-17 NOTE — CHART NOTE - NSCHARTNOTEFT_GEN_A_CORE
GISELA NIETO    Male   1985    34 Yrs         Data Loaded: 04- 11:14                                                                                                            COVID-19 PCR          Previous Result      Next Result        Order Start Date & Time  04- 16:20 Result Date & Time  04- 07:42     Ordering Clinician  Krunal Luna Result Status  Final     Specimen  .Nasopharyngeal  Last Updated At   [id number]     Observation Date & Time  04- 16:20 Lab Number  [id number]     Specimen Received Date & Time  04- 01:34 Order Number  [id number]       Age at Time of Test  34 Years             Test Item    Value    Message Flag    Units    Reference Range    Test Item Status    Comments    Sensitivities       COVID-19 PCR Detected  Very Abnormal    NotDetec Final This test has been validated by [employer] to be accurate; though it has not been FDA cleared/approved by the usual pathway. As with all laboratory tests, results should be correlated with clinical findings. https://www.fda.gov/media/766887/download https://www.fda.gov/media/092584/download OUTPATIENT INFORMATION FROM [facility] "HIE" SECTION IN GISELA Porter    Male   1985    34 Yrs       Data Loaded: 04- 11:14                                                                                                      COVID-19 PCR          Previous Result      Next Result        Order Start Date & Time  04- 16:20 Result Date & Time  04- 07:42     Ordering Clinician  Krunal Luna Result Status  Final     Specimen  .Nasopharyngeal  Last Updated At   [id number]     Observation Date & Time  04- 16:20 Lab Number  [id number]     Specimen Received Date & Time  04- 01:34 Order Number  [id number]       Age at Time of Test  34 Years       COVID-19 PCR Detected  Very Abnormal    NotDetec Final This test has been validated by [employer] to be accurate; though it has not been FDA cleared/approved by the usual pathway. As with all laboratory tests, results should be correlated with clinical findings. https://www.fda.gov/media/986712/download https://www.fda.gov/media/634533/download

## 2020-04-18 RX ORDER — HYDROXYCHLOROQUINE SULFATE 200 MG
200 TABLET ORAL
Qty: 600 | Refills: 0
Start: 2020-04-18 | End: 2020-04-20

## 2020-05-26 ENCOUNTER — TRANSCRIPTION ENCOUNTER (OUTPATIENT)
Age: 35
End: 2020-05-26

## 2020-05-28 PROBLEM — R05 COUGH: Status: ACTIVE | Noted: 2020-05-28

## 2020-05-28 PROBLEM — R50.9 FEVER: Status: ACTIVE | Noted: 2020-05-28

## 2020-05-28 PROBLEM — R06.02 SHORTNESS OF BREATH AT REST: Status: ACTIVE | Noted: 2020-05-28

## 2020-05-28 PROBLEM — U07.1 2019 NOVEL CORONAVIRUS DISEASE (COVID-19): Status: ACTIVE | Noted: 2020-05-28

## 2020-05-28 PROBLEM — R06.2 WHEEZING: Status: ACTIVE | Noted: 2020-05-28

## 2020-05-28 NOTE — HISTORY OF PRESENT ILLNESS
[Patient presents to the office today for COVID-19 evaluation and testing.] : Patient presents to the office today for COVID-19 evaluation and testing. [Patient has been pre-screened by RN at call center for appointment today with our facility.] : Patient has been pre-screened by RN at call center for appointment today with our facility. [] : no dyspnea on exertion [None Known] : none known [None] : none [Clear] : clear [Hypoxic with minimal exertion (walking)] : hypoxic with minimal exertion (walking) [911 called for emergent transfer] : 911 called for emergent transfer [FreeTextEntry1] : GISELA NIETO is a 34 year   Y/O  male  patient   CC:"My symptoms developed  10 days   "\par \par \par PHMx: HIV meds Taqueria 150mg \par \par HPi: Pt has been feeling symptomatic for 10 days , N/V/D/C \par  [TextBox_48] : spitting up [place] Stuff [TextBox_97] : 89 [TextBox_107] : PT temp ,101.8  has been ill; for 10 days ambulatory 02 89

## 2020-06-04 ENCOUNTER — TRANSCRIPTION ENCOUNTER (OUTPATIENT)
Age: 35
End: 2020-06-04

## 2024-05-01 NOTE — ED ADULT NURSE NOTE - FINAL NURSING ELECTRONIC SIGNATURE
Spoke with pt regarding recommendations. Pt states that he will call to schedule appt with Dr. Ramsey. Lab appt scheduled for 3mths. Pls sign orders.   15-Apr-2020 02:36

## 2024-12-03 NOTE — DISCHARGE NOTE PROVIDER - NSCORESITESY/N_GEN_A_CORE_RD
----- Message from Kris Saldaña PA-C sent at 12/3/2024  9:16 AM CST -----  Please let patient know that he is immune to Varicella (chicken pox), meaning that he has either been vaccinated in the past, or he has been exposed to Varicella.   Yes